# Patient Record
Sex: FEMALE | Race: WHITE
[De-identification: names, ages, dates, MRNs, and addresses within clinical notes are randomized per-mention and may not be internally consistent; named-entity substitution may affect disease eponyms.]

---

## 2022-02-23 ENCOUNTER — HOSPITAL ENCOUNTER (OUTPATIENT)
Dept: HOSPITAL 100 - OLS.ACW300 | Age: 82
Discharge: HOME | End: 2022-02-23
Payer: SELF-PAY

## 2022-02-23 DIAGNOSIS — R27.9: ICD-10-CM
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
## 2022-02-23 DIAGNOSIS — M62.81: ICD-10-CM

## 2022-02-23 DIAGNOSIS — M79.7: ICD-10-CM

## 2022-02-23 DIAGNOSIS — G35: Primary | ICD-10-CM

## 2022-02-23 DIAGNOSIS — R41.841: ICD-10-CM

## 2022-02-23 LAB
ALANINE AMINOTRANSFER ALT/SGPT: 21 U/L (ref 13–56)
ALBUMIN SERPL-MCNC: 2.9 G/DL (ref 3.2–5)
ALKALINE PHOSPHATASE: 102 U/L (ref 45–117)
ANION GAP: 3 (ref 5–15)
AST(SGOT): 7 U/L (ref 15–37)
BUN SERPL-MCNC: 18 MG/DL (ref 7–18)
BUN/CREAT RATIO: 24.6 RATIO (ref 10–20)
CALCIUM SERPL-MCNC: 8.9 MG/DL (ref 8.5–10.1)
CARBON DIOXIDE: 33 MMOL/L (ref 21–32)
CHLORIDE: 104 MMOL/L (ref 98–107)
CHOLEST SERPL-MCNC: 118 MG/DL
DEPRECATED RDW RBC: 46.4 FL (ref 35.1–43.9)
ERYTHROCYTE [DISTWIDTH] IN BLOOD: 14.6 % (ref 11.6–14.6)
EST GLOM FILT RATE - AFR AMER: 98 ML/MIN (ref 60–?)
GLOBULIN: 3.2 G/DL (ref 2.2–4.2)
GLUCOSE: 82 MG/DL (ref 74–106)
HCT VFR BLD AUTO: 31.3 % (ref 37–47)
HEMOGLOBIN: 11.3 G/DL (ref 12–15)
HGB BLD-MCNC: 11.3 G/DL (ref 12–15)
MCV RBC: 91.8 FL (ref 81–99)
MEAN CORP HGB CONC: 36.1 G/DL (ref 32–36)
MEAN PLATELET VOL.: 10.3 FL (ref 6.2–12)
PLATELET # BLD: 228 K/MM3 (ref 150–450)
PLATELET COUNT: 228 K/MM3 (ref 150–450)
POTASSIUM: 3.4 MMOL/L (ref 3.5–5.1)
RBC # BLD AUTO: 3.41 M/MM3 (ref 4.2–5.4)
RBC DISTRIBUTION WIDTH CV: 14.6 % (ref 11.6–14.6)
RBC DISTRIBUTION WIDTH SD: 46.4 FL (ref 35.1–43.9)
TRIGLYCERIDES: 54 MG/DL
VITAMIN D,25 HYDROXY: 60.4 NG/ML
VLDLC SERPL-MCNC: 11 MG/DL (ref 5–40)
WBC # BLD AUTO: 4.4 K/MM3 (ref 4.4–11)
WHITE BLOOD COUNT: 4.4 K/MM3 (ref 4.4–11)

## 2022-02-23 PROCEDURE — P9604 ONE-WAY ALLOW PRORATED TRIP: HCPCS

## 2022-02-23 PROCEDURE — 84443 ASSAY THYROID STIM HORMONE: CPT

## 2022-02-23 PROCEDURE — 36415 COLL VENOUS BLD VENIPUNCTURE: CPT

## 2022-02-23 PROCEDURE — 80061 LIPID PANEL: CPT

## 2022-02-23 PROCEDURE — 80053 COMPREHEN METABOLIC PANEL: CPT

## 2022-02-23 PROCEDURE — 85027 COMPLETE CBC AUTOMATED: CPT

## 2022-02-23 PROCEDURE — 82306 VITAMIN D 25 HYDROXY: CPT

## 2022-07-11 ENCOUNTER — HOSPITAL ENCOUNTER (OUTPATIENT)
Dept: HOSPITAL 100 - OLS.ACW100 | Age: 82
Discharge: HOME | End: 2022-07-11
Payer: SELF-PAY

## 2022-07-11 DIAGNOSIS — K80.20: ICD-10-CM

## 2022-07-11 DIAGNOSIS — G58.9: ICD-10-CM

## 2022-07-11 DIAGNOSIS — M41.9: ICD-10-CM

## 2022-07-11 DIAGNOSIS — M19.90: ICD-10-CM

## 2022-07-11 DIAGNOSIS — E55.9: ICD-10-CM

## 2022-07-11 DIAGNOSIS — M54.50: Primary | ICD-10-CM

## 2022-07-11 LAB
ALANINE AMINOTRANSFER ALT/SGPT: 14 U/L (ref 13–56)
ALBUMIN SERPL-MCNC: 3 G/DL (ref 3.2–5)
ALKALINE PHOSPHATASE: 93 U/L (ref 45–117)
ANION GAP: 8 (ref 5–15)
AST(SGOT): 14 U/L (ref 15–37)
BUN SERPL-MCNC: 22 MG/DL (ref 7–18)
BUN/CREAT RATIO: 32.9 RATIO (ref 10–20)
CALCIUM SERPL-MCNC: 8.8 MG/DL (ref 8.5–10.1)
CARBON DIOXIDE: 29 MMOL/L (ref 21–32)
CHLORIDE: 105 MMOL/L (ref 98–107)
CHOLEST SERPL-MCNC: 126 MG/DL
DEPRECATED RDW RBC: 46.1 FL (ref 35.1–43.9)
ERYTHROCYTE [DISTWIDTH] IN BLOOD: 13.8 % (ref 11.6–14.6)
EST GLOM FILT RATE - AFR AMER: 109 ML/MIN (ref 60–?)
GLOBULIN: 3.1 G/DL (ref 2.2–4.2)
GLUCOSE: 67 MG/DL (ref 74–106)
HCT VFR BLD AUTO: 36.9 % (ref 37–47)
HEMOGLOBIN: 11.8 G/DL (ref 12–15)
HGB BLD-MCNC: 11.8 G/DL (ref 12–15)
MCV RBC: 91.8 FL (ref 81–99)
MEAN CORP HGB CONC: 32 G/DL (ref 32–36)
MEAN PLATELET VOL.: 10.1 FL (ref 6.2–12)
PLATELET # BLD: 269 K/MM3 (ref 150–450)
PLATELET COUNT: 269 K/MM3 (ref 150–450)
POTASSIUM: 4.2 MMOL/L (ref 3.5–5.1)
RBC # BLD AUTO: 4.02 M/MM3 (ref 4.2–5.4)
RBC DISTRIBUTION WIDTH CV: 13.8 % (ref 11.6–14.6)
RBC DISTRIBUTION WIDTH SD: 46.1 FL (ref 35.1–43.9)
TRIGLYCERIDES: 31 MG/DL
VITAMIN D,25 HYDROXY: 74.8 NG/ML
VLDLC SERPL-MCNC: 6 MG/DL (ref 5–40)
WBC # BLD AUTO: 4.2 K/MM3 (ref 4.4–11)
WHITE BLOOD COUNT: 4.2 K/MM3 (ref 4.4–11)

## 2022-07-11 PROCEDURE — 80061 LIPID PANEL: CPT

## 2022-07-11 PROCEDURE — 80053 COMPREHEN METABOLIC PANEL: CPT

## 2022-07-11 PROCEDURE — 82306 VITAMIN D 25 HYDROXY: CPT

## 2022-07-11 PROCEDURE — P9604 ONE-WAY ALLOW PRORATED TRIP: HCPCS

## 2022-07-11 PROCEDURE — 36415 COLL VENOUS BLD VENIPUNCTURE: CPT

## 2022-07-11 PROCEDURE — 84443 ASSAY THYROID STIM HORMONE: CPT

## 2022-07-11 PROCEDURE — 85027 COMPLETE CBC AUTOMATED: CPT

## 2022-09-12 ENCOUNTER — HOSPITAL ENCOUNTER (OUTPATIENT)
Dept: HOSPITAL 100 - OLS.ACW100 | Age: 82
End: 2022-09-12
Payer: MEDICARE

## 2022-09-12 DIAGNOSIS — R73.09: ICD-10-CM

## 2022-09-12 DIAGNOSIS — R94.31: ICD-10-CM

## 2022-09-12 DIAGNOSIS — I87.2: ICD-10-CM

## 2022-09-12 DIAGNOSIS — R30.0: Primary | ICD-10-CM

## 2022-09-12 DIAGNOSIS — S06.0X0D: ICD-10-CM

## 2022-09-12 LAB
CALCIUM OXALATE CRYSTALS UR: (no result) /HPF
CAOX CRY URNS QL MICRO: (no result) /HPF
LEUKOCYTE ESTERASE UR QL STRIP: 25 /UL
MUCOUS THREADS URNS QL MICRO: (no result) /HPF
PROT UR QL STRIP.AUTO: 15 MG/DL
RBC UR QL: (no result) /HPF (ref 0–5)
RBC UR QL: 50 /UL
SP GR UR: 1.01 (ref 1–1.03)
SQUAMOUS URNS QL MICRO: (no result) /HPF (ref 5–10)
URINE PRESERVATIVE: (no result)

## 2022-09-12 PROCEDURE — 81001 URINALYSIS AUTO W/SCOPE: CPT

## 2022-09-12 PROCEDURE — 87186 SC STD MICRODIL/AGAR DIL: CPT

## 2022-09-12 PROCEDURE — 87088 URINE BACTERIA CULTURE: CPT

## 2022-09-12 PROCEDURE — 87086 URINE CULTURE/COLONY COUNT: CPT

## 2022-09-12 PROCEDURE — 87077 CULTURE AEROBIC IDENTIFY: CPT

## 2022-10-21 ENCOUNTER — HOSPITAL ENCOUNTER (OUTPATIENT)
Dept: HOSPITAL 100 - OLS.ACW300 | Age: 82
End: 2022-10-21
Payer: MEDICARE

## 2022-10-21 DIAGNOSIS — I87.2: Primary | ICD-10-CM

## 2022-10-21 DIAGNOSIS — Z79.899: ICD-10-CM

## 2022-10-21 DIAGNOSIS — R73.09: ICD-10-CM

## 2022-10-21 DIAGNOSIS — R94.31: ICD-10-CM

## 2022-10-21 DIAGNOSIS — S06.0X0D: ICD-10-CM

## 2022-10-21 LAB
LEUKOCYTE ESTERASE UR QL STRIP: 25 /UL
MUCOUS THREADS URNS QL MICRO: (no result) /HPF
RBC UR QL: (no result) /HPF (ref 0–5)
RBC UR QL: 25 /UL
SP GR UR: 1.02 (ref 1–1.03)
SQUAMOUS URNS QL MICRO: (no result) /HPF (ref 5–10)
URINE PRESERVATIVE: (no result)

## 2022-10-21 PROCEDURE — 81001 URINALYSIS AUTO W/SCOPE: CPT

## 2022-10-21 PROCEDURE — 87088 URINE BACTERIA CULTURE: CPT

## 2022-10-21 PROCEDURE — 87086 URINE CULTURE/COLONY COUNT: CPT

## 2022-10-21 PROCEDURE — 87186 SC STD MICRODIL/AGAR DIL: CPT

## 2022-10-21 PROCEDURE — 87077 CULTURE AEROBIC IDENTIFY: CPT

## 2023-05-17 ENCOUNTER — HOSPITAL ENCOUNTER (OUTPATIENT)
Dept: HOSPITAL 100 - OLS.ACW300 | Age: 83
End: 2023-05-17
Payer: MEDICARE

## 2023-05-17 DIAGNOSIS — N39.0: Primary | ICD-10-CM

## 2023-05-17 LAB — EST GLOM FILT RATE - AFR AMER: 91 ML/MIN (ref 60–?)

## 2023-05-17 PROCEDURE — 87077 CULTURE AEROBIC IDENTIFY: CPT

## 2023-05-17 PROCEDURE — 87088 URINE BACTERIA CULTURE: CPT

## 2023-05-17 PROCEDURE — 36415 COLL VENOUS BLD VENIPUNCTURE: CPT

## 2023-05-17 PROCEDURE — 87186 SC STD MICRODIL/AGAR DIL: CPT

## 2023-05-17 PROCEDURE — 87086 URINE CULTURE/COLONY COUNT: CPT

## 2023-05-17 PROCEDURE — P9604 ONE-WAY ALLOW PRORATED TRIP: HCPCS

## 2023-05-17 PROCEDURE — 82565 ASSAY OF CREATININE: CPT

## 2023-06-03 ENCOUNTER — HOSPITAL ENCOUNTER (OUTPATIENT)
Dept: HOSPITAL 100 - OLS.ACW300 | Age: 83
End: 2023-06-03
Payer: MEDICARE

## 2023-06-03 DIAGNOSIS — T76.11XD: Primary | ICD-10-CM

## 2023-06-03 DIAGNOSIS — I87.2: ICD-10-CM

## 2023-06-03 DIAGNOSIS — S06.0X0D: ICD-10-CM

## 2023-06-03 DIAGNOSIS — R94.31: ICD-10-CM

## 2023-06-03 LAB — EST GLOM FILT RATE - AFR AMER: 87 ML/MIN (ref 60–?)

## 2023-06-03 PROCEDURE — 36415 COLL VENOUS BLD VENIPUNCTURE: CPT

## 2023-06-03 PROCEDURE — 82565 ASSAY OF CREATININE: CPT

## 2023-06-03 PROCEDURE — P9604 ONE-WAY ALLOW PRORATED TRIP: HCPCS

## 2023-06-19 ENCOUNTER — HOSPITAL ENCOUNTER (OUTPATIENT)
Dept: HOSPITAL 100 - OLS.ACW300 | Age: 83
End: 2023-06-19
Payer: MEDICARE

## 2023-06-19 DIAGNOSIS — R39.9: Primary | ICD-10-CM

## 2023-06-19 LAB
LEUKOCYTE ESTERASE UR QL STRIP: 500 /UL
PROT UR QL STRIP.AUTO: 30 MG/DL
RBC UR QL: 150 /UL
SP GR UR: 1.01 (ref 1–1.03)
URINE PRESERVATIVE: (no result)

## 2023-06-19 PROCEDURE — 81002 URINALYSIS NONAUTO W/O SCOPE: CPT

## 2023-06-19 PROCEDURE — 87077 CULTURE AEROBIC IDENTIFY: CPT

## 2023-06-19 PROCEDURE — 87086 URINE CULTURE/COLONY COUNT: CPT

## 2023-06-19 PROCEDURE — 87186 SC STD MICRODIL/AGAR DIL: CPT

## 2023-06-19 PROCEDURE — 87088 URINE BACTERIA CULTURE: CPT

## 2023-07-07 ENCOUNTER — HOSPITAL ENCOUNTER (OUTPATIENT)
Dept: HOSPITAL 100 - OLS.ACW300 | Age: 83
End: 2023-07-07
Payer: MEDICARE

## 2023-07-07 DIAGNOSIS — Z79.899: Primary | ICD-10-CM

## 2023-07-07 LAB
LEUKOCYTE ESTERASE UR QL STRIP: 500 /UL
MUCOUS THREADS URNS QL MICRO: (no result) /HPF
PROT UR QL STRIP.AUTO: 15 MG/DL
RBC UR QL: (no result) /HPF (ref 0–5)
RBC UR QL: 150 /UL
SP GR UR: 1.01 (ref 1–1.03)
SQUAMOUS URNS QL MICRO: (no result) /HPF (ref 5–10)
TRI-PHOS CRY URNS QL MICRO: (no result) /HPF
TRIPLE PHOSPHATE CRYSTALS UR: (no result) /HPF
URINE PRESERVATIVE: (no result)

## 2023-07-07 PROCEDURE — 81001 URINALYSIS AUTO W/SCOPE: CPT

## 2023-07-07 PROCEDURE — 87088 URINE BACTERIA CULTURE: CPT

## 2023-07-07 PROCEDURE — 87186 SC STD MICRODIL/AGAR DIL: CPT

## 2023-07-07 PROCEDURE — 87077 CULTURE AEROBIC IDENTIFY: CPT

## 2023-07-07 PROCEDURE — 87086 URINE CULTURE/COLONY COUNT: CPT

## 2023-07-12 ENCOUNTER — HOSPITAL ENCOUNTER (OUTPATIENT)
Dept: HOSPITAL 100 - OLS.ACW300 | Age: 83
End: 2023-07-12
Payer: MEDICARE

## 2023-07-12 DIAGNOSIS — G35: Primary | ICD-10-CM

## 2023-07-12 LAB — ALBUMIN SERPL-MCNC: 2.9 G/DL (ref 3.2–5)

## 2023-07-12 PROCEDURE — P9604 ONE-WAY ALLOW PRORATED TRIP: HCPCS

## 2023-07-12 PROCEDURE — 82040 ASSAY OF SERUM ALBUMIN: CPT

## 2023-07-12 PROCEDURE — 36415 COLL VENOUS BLD VENIPUNCTURE: CPT

## 2023-07-21 ENCOUNTER — HOSPITAL ENCOUNTER (OUTPATIENT)
Dept: HOSPITAL 100 - OLS.ACW300 | Age: 83
End: 2023-07-21
Payer: MEDICARE

## 2023-07-21 DIAGNOSIS — R30.0: Primary | ICD-10-CM

## 2023-07-21 LAB
LEUKOCYTE ESTERASE UR QL STRIP: 500 /UL
MUCOUS THREADS URNS QL MICRO: (no result) /HPF
RBC UR QL: (no result) /HPF (ref 0–5)
RBC UR QL: 150 /UL
SP GR UR: 1.01 (ref 1–1.03)
SQUAMOUS URNS QL MICRO: (no result) /HPF (ref 5–10)
URINE PRESERVATIVE: (no result)

## 2023-07-21 PROCEDURE — 87077 CULTURE AEROBIC IDENTIFY: CPT

## 2023-07-21 PROCEDURE — 87186 SC STD MICRODIL/AGAR DIL: CPT

## 2023-07-21 PROCEDURE — 87086 URINE CULTURE/COLONY COUNT: CPT

## 2023-07-21 PROCEDURE — 87088 URINE BACTERIA CULTURE: CPT

## 2023-07-21 PROCEDURE — 81001 URINALYSIS AUTO W/SCOPE: CPT

## 2023-08-23 ENCOUNTER — HOSPITAL ENCOUNTER (OUTPATIENT)
Dept: HOSPITAL 100 - OLS.ACW300 | Age: 83
End: 2023-08-23
Payer: MEDICARE

## 2023-08-23 DIAGNOSIS — Z79.899: Primary | ICD-10-CM

## 2023-08-23 PROCEDURE — 87086 URINE CULTURE/COLONY COUNT: CPT

## 2023-08-23 PROCEDURE — 87088 URINE BACTERIA CULTURE: CPT

## 2023-08-23 PROCEDURE — 81002 URINALYSIS NONAUTO W/O SCOPE: CPT

## 2023-08-23 PROCEDURE — 87186 SC STD MICRODIL/AGAR DIL: CPT

## 2023-08-23 PROCEDURE — 87077 CULTURE AEROBIC IDENTIFY: CPT

## 2023-08-24 LAB
LEUKOCYTE ESTERASE UR QL STRIP: 500 /UL
PROT UR QL STRIP.AUTO: 15 MG/DL
RBC UR QL: 150 /UL
SP GR UR: 1.01 (ref 1–1.03)
URINE PRESERVATIVE: (no result)

## 2023-09-12 ENCOUNTER — HOSPITAL ENCOUNTER (OUTPATIENT)
Dept: HOSPITAL 100 - OLS.ACW300 | Age: 83
End: 2023-09-12
Payer: MEDICARE

## 2023-09-12 DIAGNOSIS — Z79.899: ICD-10-CM

## 2023-09-12 DIAGNOSIS — I87.2: Primary | ICD-10-CM

## 2023-09-12 DIAGNOSIS — R94.31: ICD-10-CM

## 2023-09-12 LAB
ANION GAP: 1 (ref 5–15)
BUN SERPL-MCNC: 22 MG/DL (ref 7–18)
BUN/CREAT RATIO: 29.5 RATIO (ref 10–20)
CALCIUM SERPL-MCNC: 8.7 MG/DL (ref 8.5–10.1)
CARBON DIOXIDE: 31 MMOL/L (ref 21–32)
CHLORIDE: 110 MMOL/L (ref 98–107)
DEPRECATED RDW RBC: 49.5 FL (ref 35.1–43.9)
ERYTHROCYTE [DISTWIDTH] IN BLOOD: 14.5 % (ref 11.6–14.6)
EST GLOM FILT RATE - AFR AMER: 96 ML/MIN (ref 60–?)
GLUCOSE: 87 MG/DL (ref 74–106)
HCT VFR BLD AUTO: 39.1 % (ref 37–47)
HEMOGLOBIN: 12 G/DL (ref 12–15)
HGB BLD-MCNC: 12 G/DL (ref 12–15)
MCV RBC: 94 FL (ref 81–99)
MEAN CORP HGB CONC: 30.7 G/DL (ref 32–36)
MEAN PLATELET VOL.: 11 FL (ref 6.2–12)
PLATELET # BLD: 180 K/MM3 (ref 150–450)
PLATELET COUNT: 180 K/MM3 (ref 150–450)
POTASSIUM: 4.1 MMOL/L (ref 3.5–5.1)
RBC # BLD AUTO: 4.16 M/MM3 (ref 4.2–5.4)
RBC DISTRIBUTION WIDTH CV: 14.5 % (ref 11.6–14.6)
RBC DISTRIBUTION WIDTH SD: 49.5 FL (ref 35.1–43.9)
WBC # BLD AUTO: 4.7 K/MM3 (ref 4.4–11)
WHITE BLOOD COUNT: 4.7 K/MM3 (ref 4.4–11)

## 2023-09-12 PROCEDURE — P9604 ONE-WAY ALLOW PRORATED TRIP: HCPCS

## 2023-09-12 PROCEDURE — 36415 COLL VENOUS BLD VENIPUNCTURE: CPT

## 2023-09-12 PROCEDURE — 80048 BASIC METABOLIC PNL TOTAL CA: CPT

## 2023-09-12 PROCEDURE — 85027 COMPLETE CBC AUTOMATED: CPT

## 2023-10-04 ENCOUNTER — HOSPITAL ENCOUNTER (OUTPATIENT)
Dept: HOSPITAL 100 - OLS.ACW300 | Age: 83
End: 2023-10-04
Payer: MEDICARE

## 2023-10-04 DIAGNOSIS — Z79.899: Primary | ICD-10-CM

## 2023-10-04 LAB
LEUKOCYTE ESTERASE UR QL STRIP: 500 /UL
MUCOUS THREADS URNS QL MICRO: (no result) /HPF
PROT UR QL STRIP.AUTO: 30 MG/DL
RBC UR QL: (no result) /HPF (ref 0–5)
RBC UR QL: 50 /UL
SP GR UR: 1.01 (ref 1–1.03)
SQUAMOUS URNS QL MICRO: (no result) /HPF (ref 5–10)
URINE PRESERVATIVE: (no result)

## 2023-10-04 PROCEDURE — 87088 URINE BACTERIA CULTURE: CPT

## 2023-10-04 PROCEDURE — 87186 SC STD MICRODIL/AGAR DIL: CPT

## 2023-10-04 PROCEDURE — 87077 CULTURE AEROBIC IDENTIFY: CPT

## 2023-10-04 PROCEDURE — 81001 URINALYSIS AUTO W/SCOPE: CPT

## 2023-10-04 PROCEDURE — 87086 URINE CULTURE/COLONY COUNT: CPT

## 2023-10-11 ENCOUNTER — HOSPITAL ENCOUNTER (OUTPATIENT)
Dept: HOSPITAL 100 - OLS.ACW300 | Age: 83
End: 2023-10-11
Payer: MEDICARE

## 2023-10-11 DIAGNOSIS — S06.0X0D: ICD-10-CM

## 2023-10-11 DIAGNOSIS — R94.31: ICD-10-CM

## 2023-10-11 DIAGNOSIS — T76.11XD: Primary | ICD-10-CM

## 2023-10-11 DIAGNOSIS — I87.2: ICD-10-CM

## 2023-10-11 LAB
ANION GAP: 4 (ref 5–15)
BUN SERPL-MCNC: 24 MG/DL (ref 7–18)
BUN/CREAT RATIO: 31.7 RATIO (ref 10–20)
CALCIUM SERPL-MCNC: 9 MG/DL (ref 8.5–10.1)
CARBON DIOXIDE: 30 MMOL/L (ref 21–32)
CHLORIDE: 111 MMOL/L (ref 98–107)
DEPRECATED RDW RBC: 49.4 FL (ref 35.1–43.9)
ERYTHROCYTE [DISTWIDTH] IN BLOOD: 14.6 % (ref 11.6–14.6)
EST GLOM FILT RATE - AFR AMER: 94 ML/MIN (ref 60–?)
GLUCOSE: 81 MG/DL (ref 74–106)
HCT VFR BLD AUTO: 32.7 % (ref 37–47)
HEMOGLOBIN: 11.6 G/DL (ref 12–15)
HGB BLD-MCNC: 11.6 G/DL (ref 12–15)
MCV RBC: 95.9 FL (ref 81–99)
MEAN CORP HGB CONC: 35.5 G/DL (ref 32–36)
MEAN PLATELET VOL.: 10.8 FL (ref 6.2–12)
PLATELET # BLD: 175 K/MM3 (ref 150–450)
PLATELET COUNT: 175 K/MM3 (ref 150–450)
POTASSIUM: 3.9 MMOL/L (ref 3.5–5.1)
RBC # BLD AUTO: 3.41 M/MM3 (ref 4.2–5.4)
RBC DISTRIBUTION WIDTH CV: 14.6 % (ref 11.6–14.6)
RBC DISTRIBUTION WIDTH SD: 49.4 FL (ref 35.1–43.9)
WBC # BLD AUTO: 3.8 K/MM3 (ref 4.4–11)
WHITE BLOOD COUNT: 3.8 K/MM3 (ref 4.4–11)

## 2023-10-11 PROCEDURE — 36415 COLL VENOUS BLD VENIPUNCTURE: CPT

## 2023-10-11 PROCEDURE — P9604 ONE-WAY ALLOW PRORATED TRIP: HCPCS

## 2023-10-11 PROCEDURE — 85027 COMPLETE CBC AUTOMATED: CPT

## 2023-10-11 PROCEDURE — 80048 BASIC METABOLIC PNL TOTAL CA: CPT

## 2023-10-25 ENCOUNTER — HOSPITAL ENCOUNTER (OUTPATIENT)
Dept: HOSPITAL 100 - OLS.ACW300 | Age: 83
End: 2023-10-25
Payer: MEDICARE

## 2023-10-25 DIAGNOSIS — I87.2: ICD-10-CM

## 2023-10-25 DIAGNOSIS — S06.0X0D: ICD-10-CM

## 2023-10-25 DIAGNOSIS — R94.31: ICD-10-CM

## 2023-10-25 DIAGNOSIS — T76.11XD: ICD-10-CM

## 2023-10-25 DIAGNOSIS — M79.89: Primary | ICD-10-CM

## 2023-10-25 LAB
ALANINE AMINOTRANSFER ALT/SGPT: 19 U/L (ref 13–56)
ALBUMIN SERPL-MCNC: 2.8 G/DL (ref 3.2–5)
ALKALINE PHOSPHATASE: 150 U/L (ref 45–117)
ANION GAP: 3 (ref 5–15)
AST(SGOT): 12 U/L (ref 15–37)
BNP,B-TYPE NATRIURETIC PEPTIDE: 76.9 PG/ML (ref 0–100)
BUN SERPL-MCNC: 28 MG/DL (ref 7–18)
BUN/CREAT RATIO: 32.9 RATIO (ref 10–20)
CALCIUM SERPL-MCNC: 9.1 MG/DL (ref 8.5–10.1)
CARBON DIOXIDE: 29 MMOL/L (ref 21–32)
CHLORIDE: 109 MMOL/L (ref 98–107)
CRP SERPL-MCNC: < 2.9 MG/L (ref 0–3)
DEPRECATED RDW RBC: 47.4 FL (ref 35.1–43.9)
ERYTHROCYTE [DISTWIDTH] IN BLOOD: 14 % (ref 11.6–14.6)
EST GLOM FILT RATE - AFR AMER: 82 ML/MIN (ref 60–?)
GLOBULIN: 3 G/DL (ref 2.2–4.2)
GLUCOSE: 75 MG/DL (ref 74–106)
HCT VFR BLD AUTO: 36.9 % (ref 37–47)
HEMOGLOBIN: 12.4 G/DL (ref 12–15)
HGB BLD-MCNC: 12.4 G/DL (ref 12–15)
MAGNESIUM: 2.3 MG/DL (ref 1.6–2.6)
MCV RBC: 93.9 FL (ref 81–99)
MEAN CORP HGB CONC: 33.6 G/DL (ref 32–36)
MEAN PLATELET VOL.: 10.5 FL (ref 6.2–12)
PLATELET # BLD: 179 K/MM3 (ref 150–450)
PLATELET COUNT: 179 K/MM3 (ref 150–450)
POTASSIUM: 3.7 MMOL/L (ref 3.5–5.1)
RBC # BLD AUTO: 3.93 M/MM3 (ref 4.2–5.4)
RBC DISTRIBUTION WIDTH CV: 14 % (ref 11.6–14.6)
RBC DISTRIBUTION WIDTH SD: 47.4 FL (ref 35.1–43.9)
WBC # BLD AUTO: 3.8 K/MM3 (ref 4.4–11)
WHITE BLOOD COUNT: 3.8 K/MM3 (ref 4.4–11)

## 2023-10-25 PROCEDURE — 86140 C-REACTIVE PROTEIN: CPT

## 2023-10-25 PROCEDURE — 83735 ASSAY OF MAGNESIUM: CPT

## 2023-10-25 PROCEDURE — 80053 COMPREHEN METABOLIC PANEL: CPT

## 2023-10-25 PROCEDURE — 85027 COMPLETE CBC AUTOMATED: CPT

## 2023-10-25 PROCEDURE — P9604 ONE-WAY ALLOW PRORATED TRIP: HCPCS

## 2023-10-25 PROCEDURE — 36415 COLL VENOUS BLD VENIPUNCTURE: CPT

## 2023-10-25 PROCEDURE — 83880 ASSAY OF NATRIURETIC PEPTIDE: CPT

## 2023-11-03 ENCOUNTER — HOSPITAL ENCOUNTER (OUTPATIENT)
Dept: HOSPITAL 100 - OLS.ACW300 | Age: 83
End: 2023-11-03
Payer: MEDICARE

## 2023-11-03 DIAGNOSIS — E03.9: Primary | ICD-10-CM

## 2023-11-03 LAB
T3 SERPL IA-MCNC: 1 NG/ML (ref 0.6–1.81)
T4 SERPL-MCNC: 9.5 UG/DL (ref 4.8–13.9)

## 2023-11-03 PROCEDURE — P9604 ONE-WAY ALLOW PRORATED TRIP: HCPCS

## 2023-11-03 PROCEDURE — 36415 COLL VENOUS BLD VENIPUNCTURE: CPT

## 2023-11-03 PROCEDURE — 84480 ASSAY TRIIODOTHYRONINE (T3): CPT

## 2023-11-03 PROCEDURE — 84436 ASSAY OF TOTAL THYROXINE: CPT

## 2023-11-03 PROCEDURE — 84443 ASSAY THYROID STIM HORMONE: CPT

## 2024-01-18 ENCOUNTER — HOSPITAL ENCOUNTER (OUTPATIENT)
Dept: HOSPITAL 100 - OLS.ACW300 | Age: 84
End: 2024-01-18
Payer: MEDICARE

## 2024-01-18 DIAGNOSIS — I87.2: ICD-10-CM

## 2024-01-18 DIAGNOSIS — R94.31: ICD-10-CM

## 2024-01-18 DIAGNOSIS — Z79.899: ICD-10-CM

## 2024-01-18 DIAGNOSIS — T76.11XD: Primary | ICD-10-CM

## 2024-01-18 LAB
ANION GAP: 2 (ref 5–15)
BUN SERPL-MCNC: 27 MG/DL (ref 7–18)
BUN/CREAT RATIO: 30.1 RATIO (ref 10–20)
CALCIUM SERPL-MCNC: 9.4 MG/DL (ref 8.5–10.1)
CARBON DIOXIDE: 31 MMOL/L (ref 21–32)
CHLORIDE: 108 MMOL/L (ref 98–107)
DEPRECATED RDW RBC: 47.6 FL (ref 35.1–43.9)
ERYTHROCYTE [DISTWIDTH] IN BLOOD: 14 % (ref 11.6–14.6)
EST GLOM FILT RATE - AFR AMER: 77 ML/MIN (ref 60–?)
GLUCOSE: 79 MG/DL (ref 74–106)
HCT VFR BLD AUTO: 39.8 % (ref 37–47)
HGB BLD-MCNC: 12.9 G/DL (ref 12–15)
MCV RBC: 94.8 FL (ref 81–99)
MEAN CORP HGB CONC: 32.4 G/DL (ref 32–36)
MEAN PLATELET VOL.: 10.6 FL (ref 6.2–12)
PLATELET # BLD: 180 K/MM3 (ref 150–450)
POTASSIUM: 3.6 MMOL/L (ref 3.5–5.1)
RBC # BLD AUTO: 4.2 M/MM3 (ref 4.2–5.4)
T3 SERPL IA-MCNC: 0.92 NG/ML (ref 0.6–1.81)
T4 SERPL-MCNC: 9.7 UG/DL (ref 4.8–13.9)
WBC # BLD AUTO: 4.3 K/MM3 (ref 4.4–11)

## 2024-01-18 PROCEDURE — 83036 HEMOGLOBIN GLYCOSYLATED A1C: CPT

## 2024-01-18 PROCEDURE — 84480 ASSAY TRIIODOTHYRONINE (T3): CPT

## 2024-01-18 PROCEDURE — 84436 ASSAY OF TOTAL THYROXINE: CPT

## 2024-01-18 PROCEDURE — 36415 COLL VENOUS BLD VENIPUNCTURE: CPT

## 2024-01-18 PROCEDURE — 84443 ASSAY THYROID STIM HORMONE: CPT

## 2024-01-18 PROCEDURE — 80048 BASIC METABOLIC PNL TOTAL CA: CPT

## 2024-01-18 PROCEDURE — 85027 COMPLETE CBC AUTOMATED: CPT

## 2024-01-18 PROCEDURE — P9604 ONE-WAY ALLOW PRORATED TRIP: HCPCS

## 2024-01-31 ENCOUNTER — HOSPITAL ENCOUNTER (OUTPATIENT)
Dept: HOSPITAL 100 - OLS.ACW300 | Age: 84
End: 2024-01-31
Payer: MEDICARE

## 2024-01-31 DIAGNOSIS — T76.11XD: ICD-10-CM

## 2024-01-31 DIAGNOSIS — S06.0X0D: ICD-10-CM

## 2024-01-31 DIAGNOSIS — I87.2: Primary | ICD-10-CM

## 2024-01-31 LAB — EST GLOM FILT RATE - AFR AMER: 77 ML/MIN (ref 60–?)

## 2024-01-31 PROCEDURE — 36415 COLL VENOUS BLD VENIPUNCTURE: CPT

## 2024-01-31 PROCEDURE — P9604 ONE-WAY ALLOW PRORATED TRIP: HCPCS

## 2024-01-31 PROCEDURE — 82565 ASSAY OF CREATININE: CPT

## 2024-02-06 ENCOUNTER — HOSPITAL ENCOUNTER (OUTPATIENT)
Dept: HOSPITAL 100 - OLS.ACW300 | Age: 84
End: 2024-02-06
Payer: MEDICARE

## 2024-02-06 DIAGNOSIS — R94.31: ICD-10-CM

## 2024-02-06 DIAGNOSIS — T76.11XD: Primary | ICD-10-CM

## 2024-02-06 DIAGNOSIS — I87.2: ICD-10-CM

## 2024-02-06 DIAGNOSIS — Z79.899: ICD-10-CM

## 2024-02-06 PROCEDURE — 36415 COLL VENOUS BLD VENIPUNCTURE: CPT

## 2024-02-06 PROCEDURE — 84443 ASSAY THYROID STIM HORMONE: CPT

## 2024-02-06 PROCEDURE — P9604 ONE-WAY ALLOW PRORATED TRIP: HCPCS

## 2024-05-01 ENCOUNTER — HOSPITAL ENCOUNTER (OUTPATIENT)
Dept: HOSPITAL 100 - OLS.ACW300 | Age: 84
End: 2024-05-01
Payer: MEDICARE

## 2024-05-01 DIAGNOSIS — S06.0X0D: ICD-10-CM

## 2024-05-01 DIAGNOSIS — T76.11XD: Primary | ICD-10-CM

## 2024-05-01 DIAGNOSIS — R94.31: ICD-10-CM

## 2024-05-01 DIAGNOSIS — I87.2: ICD-10-CM

## 2024-05-01 LAB
T3 SERPL IA-MCNC: 1.06 NG/ML (ref 0.6–1.81)
T4 SERPL-MCNC: 8.8 UG/DL (ref 4.8–13.9)

## 2024-05-01 PROCEDURE — 84436 ASSAY OF TOTAL THYROXINE: CPT

## 2024-05-01 PROCEDURE — 84480 ASSAY TRIIODOTHYRONINE (T3): CPT

## 2024-05-01 PROCEDURE — 84443 ASSAY THYROID STIM HORMONE: CPT

## 2024-05-01 PROCEDURE — 36415 COLL VENOUS BLD VENIPUNCTURE: CPT

## 2024-05-01 PROCEDURE — P9604 ONE-WAY ALLOW PRORATED TRIP: HCPCS

## 2024-07-01 ENCOUNTER — HOSPITAL ENCOUNTER (OUTPATIENT)
Dept: HOSPITAL 100 - OLS.ACW300 | Age: 84
End: 2024-07-01
Payer: MEDICARE

## 2024-07-01 DIAGNOSIS — S06.0X0D: ICD-10-CM

## 2024-07-01 DIAGNOSIS — T76.11XD: Primary | ICD-10-CM

## 2024-07-01 DIAGNOSIS — R94.31: ICD-10-CM

## 2024-07-01 DIAGNOSIS — I87.2: ICD-10-CM

## 2024-07-01 LAB
CHOLEST SERPL-MCNC: 133 MG/DL
TRIGLYCERIDES: 36 MG/DL
VITAMIN D,25 HYDROXY: 89.8 NG/ML
VLDLC SERPL-MCNC: 7 MG/DL (ref 5–40)

## 2024-07-01 PROCEDURE — 36415 COLL VENOUS BLD VENIPUNCTURE: CPT

## 2024-07-01 PROCEDURE — 82306 VITAMIN D 25 HYDROXY: CPT

## 2024-07-01 PROCEDURE — P9604 ONE-WAY ALLOW PRORATED TRIP: HCPCS

## 2024-07-01 PROCEDURE — 80061 LIPID PANEL: CPT

## 2024-07-16 ENCOUNTER — HOSPITAL ENCOUNTER (OUTPATIENT)
Dept: HOSPITAL 100 - OLS.ACW300 | Age: 84
End: 2024-07-16
Payer: MEDICARE

## 2024-07-16 DIAGNOSIS — S91.002A: Primary | ICD-10-CM

## 2024-07-16 PROCEDURE — 87186 SC STD MICRODIL/AGAR DIL: CPT

## 2024-07-16 PROCEDURE — 87205 SMEAR GRAM STAIN: CPT

## 2024-07-16 PROCEDURE — 87070 CULTURE OTHR SPECIMN AEROBIC: CPT

## 2024-07-16 PROCEDURE — 87077 CULTURE AEROBIC IDENTIFY: CPT

## 2024-07-24 ENCOUNTER — HOSPITAL ENCOUNTER (OUTPATIENT)
Dept: HOSPITAL 100 - OLS.ACW300 | Age: 84
End: 2024-07-24
Payer: MEDICARE

## 2024-07-24 DIAGNOSIS — R94.31: ICD-10-CM

## 2024-07-24 DIAGNOSIS — I87.2: ICD-10-CM

## 2024-07-24 DIAGNOSIS — L03.90: Primary | ICD-10-CM

## 2024-07-24 LAB
ANION GAP: 2 (ref 5–15)
BUN SERPL-MCNC: 29 MG/DL (ref 7–18)
BUN/CREAT RATIO: 34.7 RATIO (ref 10–20)
CALCIUM SERPL-MCNC: 9 MG/DL (ref 8.5–10.1)
CARBON DIOXIDE: 33 MMOL/L (ref 21–32)
CHLORIDE: 106 MMOL/L (ref 98–107)
DEPRECATED RDW RBC: 45.9 FL (ref 35.1–43.9)
ERYTHROCYTE [DISTWIDTH] IN BLOOD: 13.7 % (ref 11.6–14.6)
EST GLOM FILT RATE - AFR AMER: 84 ML/MIN (ref 60–?)
GLUCOSE: 89 MG/DL (ref 74–106)
HCT VFR BLD AUTO: 39.9 % (ref 37–47)
HEMOGLOBIN: 12.7 G/DL (ref 12–15)
HGB BLD-MCNC: 12.7 G/DL (ref 12–15)
MCV RBC: 91.7 FL (ref 81–99)
MEAN CORP HGB CONC: 31.8 G/DL (ref 32–36)
MEAN PLATELET VOL.: 10.8 FL (ref 6.2–12)
PLATELET # BLD: 232 K/MM3 (ref 150–450)
PLATELET COUNT: 232 K/MM3 (ref 150–450)
POTASSIUM: 4 MMOL/L (ref 3.5–5.1)
RBC # BLD AUTO: 4.35 M/MM3 (ref 4.2–5.4)
RBC DISTRIBUTION WIDTH CV: 13.7 % (ref 11.6–14.6)
RBC DISTRIBUTION WIDTH SD: 45.9 FL (ref 35.1–43.9)
WBC # BLD AUTO: 5.1 K/MM3 (ref 4.4–11)
WHITE BLOOD COUNT: 5.1 K/MM3 (ref 4.4–11)

## 2024-07-24 PROCEDURE — P9604 ONE-WAY ALLOW PRORATED TRIP: HCPCS

## 2024-07-24 PROCEDURE — 80048 BASIC METABOLIC PNL TOTAL CA: CPT

## 2024-07-24 PROCEDURE — 85027 COMPLETE CBC AUTOMATED: CPT

## 2024-07-24 PROCEDURE — 36415 COLL VENOUS BLD VENIPUNCTURE: CPT

## 2024-08-16 ENCOUNTER — HOSPITAL ENCOUNTER (OUTPATIENT)
Dept: HOSPITAL 100 - OLS.ACW300 | Age: 84
End: 2024-08-16
Payer: MEDICARE

## 2024-08-16 DIAGNOSIS — R30.0: Primary | ICD-10-CM

## 2024-08-16 LAB
LEUKOCYTE ESTERASE UR QL STRIP: 500 /UL
PROT UR QL STRIP.AUTO: 30 MG/DL
RBC UR QL: 150 /UL
SP GR UR: 1.02 (ref 1–1.03)
URINE PRESERVATIVE: (no result)

## 2024-08-16 PROCEDURE — 87077 CULTURE AEROBIC IDENTIFY: CPT

## 2024-08-16 PROCEDURE — 87186 SC STD MICRODIL/AGAR DIL: CPT

## 2024-08-16 PROCEDURE — 87088 URINE BACTERIA CULTURE: CPT

## 2024-08-16 PROCEDURE — 87086 URINE CULTURE/COLONY COUNT: CPT

## 2024-08-16 PROCEDURE — 81002 URINALYSIS NONAUTO W/O SCOPE: CPT

## 2024-08-20 ENCOUNTER — HOSPITAL ENCOUNTER (OUTPATIENT)
Dept: HOSPITAL 100 - OLS.ACW300 | Age: 84
End: 2024-08-20
Payer: MEDICARE

## 2024-08-20 DIAGNOSIS — R94.31: ICD-10-CM

## 2024-08-20 DIAGNOSIS — I87.2: Primary | ICD-10-CM

## 2024-08-20 PROCEDURE — 84443 ASSAY THYROID STIM HORMONE: CPT

## 2024-08-20 PROCEDURE — P9604 ONE-WAY ALLOW PRORATED TRIP: HCPCS

## 2024-08-20 PROCEDURE — 36415 COLL VENOUS BLD VENIPUNCTURE: CPT

## 2024-09-12 ENCOUNTER — HOSPITAL ENCOUNTER (OUTPATIENT)
Dept: HOSPITAL 100 - OLS.ACW300 | Age: 84
End: 2024-09-12
Payer: MEDICARE

## 2024-09-12 DIAGNOSIS — R53.1: ICD-10-CM

## 2024-09-12 DIAGNOSIS — R41.841: ICD-10-CM

## 2024-09-12 DIAGNOSIS — I87.2: ICD-10-CM

## 2024-09-12 DIAGNOSIS — T76.11XD: Primary | ICD-10-CM

## 2024-09-12 DIAGNOSIS — R53.81: ICD-10-CM

## 2024-09-12 LAB
ANION GAP: 2 (ref 5–15)
BUN SERPL-MCNC: 18 MG/DL (ref 7–18)
BUN/CREAT RATIO: 23.8 RATIO (ref 10–20)
CALCIUM SERPL-MCNC: 9.4 MG/DL (ref 8.5–10.1)
CARBON DIOXIDE: 31 MMOL/L (ref 21–32)
CHLORIDE: 107 MMOL/L (ref 98–107)
EST GLOM FILT RATE - AFR AMER: 94 ML/MIN (ref 60–?)
GLUCOSE: 86 MG/DL (ref 74–106)
POTASSIUM: 4.1 MMOL/L (ref 3.5–5.1)

## 2024-09-12 PROCEDURE — 36415 COLL VENOUS BLD VENIPUNCTURE: CPT

## 2024-09-12 PROCEDURE — 80048 BASIC METABOLIC PNL TOTAL CA: CPT

## 2024-09-12 PROCEDURE — P9604 ONE-WAY ALLOW PRORATED TRIP: HCPCS

## 2024-09-13 ENCOUNTER — HOSPITAL ENCOUNTER (OUTPATIENT)
Dept: HOSPITAL 100 - OLS.ACW300 | Age: 84
End: 2024-09-13
Payer: MEDICARE

## 2024-09-13 DIAGNOSIS — T76.11XD: Primary | ICD-10-CM

## 2024-09-13 DIAGNOSIS — R53.1: ICD-10-CM

## 2024-09-13 DIAGNOSIS — R41.841: ICD-10-CM

## 2024-09-13 DIAGNOSIS — R53.81: ICD-10-CM

## 2024-09-13 DIAGNOSIS — I87.2: ICD-10-CM

## 2024-09-13 LAB
ALANINE AMINOTRANSFER ALT/SGPT: 18 U/L (ref 13–56)
ALBUMIN SERPL-MCNC: 2.7 G/DL (ref 3.2–5)
ALKALINE PHOSPHATASE: 93 U/L (ref 45–117)
ANION GAP: 4 (ref 5–15)
AST(SGOT): 18 U/L (ref 15–37)
BUN SERPL-MCNC: 20 MG/DL (ref 7–18)
BUN/CREAT RATIO: 26.8 RATIO (ref 10–20)
CALCIUM SERPL-MCNC: 9.3 MG/DL (ref 8.5–10.1)
CARBON DIOXIDE: 31 MMOL/L (ref 21–32)
CHLORIDE: 105 MMOL/L (ref 98–107)
DEPRECATED RDW RBC: 51.4 FL (ref 35.1–43.9)
ERYTHROCYTE [DISTWIDTH] IN BLOOD: 15.5 % (ref 11.6–14.6)
EST GLOM FILT RATE - AFR AMER: 95 ML/MIN (ref 60–?)
GLOBULIN: 3.4 G/DL (ref 2.2–4.2)
GLUCOSE: 86 MG/DL (ref 74–106)
HCT VFR BLD AUTO: 33.8 % (ref 37–47)
HEMOGLOBIN: 11 G/DL (ref 12–15)
HGB BLD-MCNC: 11 G/DL (ref 12–15)
MCV RBC: 92.3 FL (ref 81–99)
MEAN CORP HGB CONC: 32.5 G/DL (ref 32–36)
MEAN PLATELET VOL.: 10.4 FL (ref 6.2–12)
PLATELET # BLD: 279 K/MM3 (ref 150–450)
PLATELET COUNT: 279 K/MM3 (ref 150–450)
POTASSIUM: 4.3 MMOL/L (ref 3.5–5.1)
RBC # BLD AUTO: 3.66 M/MM3 (ref 4.2–5.4)
RBC DISTRIBUTION WIDTH CV: 15.5 % (ref 11.6–14.6)
RBC DISTRIBUTION WIDTH SD: 51.4 FL (ref 35.1–43.9)
WBC # BLD AUTO: 4.4 K/MM3 (ref 4.4–11)
WHITE BLOOD COUNT: 4.4 K/MM3 (ref 4.4–11)

## 2024-09-13 PROCEDURE — 80053 COMPREHEN METABOLIC PANEL: CPT

## 2024-09-13 PROCEDURE — 36415 COLL VENOUS BLD VENIPUNCTURE: CPT

## 2024-09-13 PROCEDURE — P9604 ONE-WAY ALLOW PRORATED TRIP: HCPCS

## 2024-09-13 PROCEDURE — 85027 COMPLETE CBC AUTOMATED: CPT

## 2024-09-16 ENCOUNTER — HOSPITAL ENCOUNTER (OUTPATIENT)
Dept: HOSPITAL 100 - OLS.ACW300 | Age: 84
End: 2024-09-16
Payer: MEDICARE

## 2024-09-16 DIAGNOSIS — I87.2: ICD-10-CM

## 2024-09-16 DIAGNOSIS — R41.841: ICD-10-CM

## 2024-09-16 DIAGNOSIS — R26.81: ICD-10-CM

## 2024-09-16 DIAGNOSIS — T76.11XD: Primary | ICD-10-CM

## 2024-09-16 DIAGNOSIS — R53.81: ICD-10-CM

## 2024-09-16 DIAGNOSIS — R53.1: ICD-10-CM

## 2024-09-16 LAB
ANION GAP: 4 (ref 5–15)
BUN SERPL-MCNC: 28 MG/DL (ref 7–18)
BUN/CREAT RATIO: 28.1 RATIO (ref 10–20)
CALCIUM SERPL-MCNC: 9.6 MG/DL (ref 8.5–10.1)
CARBON DIOXIDE: 34 MMOL/L (ref 21–32)
CHLORIDE: 101 MMOL/L (ref 98–107)
DEPRECATED RDW RBC: 51.8 FL (ref 35.1–43.9)
ERYTHROCYTE [DISTWIDTH] IN BLOOD: 15.6 % (ref 11.6–14.6)
EST GLOM FILT RATE - AFR AMER: 68 ML/MIN (ref 60–?)
GLUCOSE: 105 MG/DL (ref 74–106)
HCT VFR BLD AUTO: 36.7 % (ref 37–47)
HEMOGLOBIN: 11.5 G/DL (ref 12–15)
HGB BLD-MCNC: 11.5 G/DL (ref 12–15)
IMMATURE GRANULOCYTES COUNT: 0.01 X10^3/UL (ref 0–0)
MCV RBC: 91.3 FL (ref 81–99)
MEAN CORP HGB CONC: 31.3 G/DL (ref 32–36)
MEAN PLATELET VOL.: 10.3 FL (ref 6.2–12)
NRBC FLAGGED BY ANALYZER: 0 % (ref 0–5)
PLATELET # BLD: 354 K/MM3 (ref 150–450)
PLATELET COUNT: 354 K/MM3 (ref 150–450)
POTASSIUM: 4.1 MMOL/L (ref 3.5–5.1)
RBC # BLD AUTO: 4.02 M/MM3 (ref 4.2–5.4)
RBC DISTRIBUTION WIDTH CV: 15.6 % (ref 11.6–14.6)
RBC DISTRIBUTION WIDTH SD: 51.8 FL (ref 35.1–43.9)
WBC # BLD AUTO: 4.7 K/MM3 (ref 4.4–11)
WHITE BLOOD COUNT: 4.7 K/MM3 (ref 4.4–11)

## 2024-09-16 PROCEDURE — P9604 ONE-WAY ALLOW PRORATED TRIP: HCPCS

## 2024-09-16 PROCEDURE — 85025 COMPLETE CBC W/AUTO DIFF WBC: CPT

## 2024-09-16 PROCEDURE — 36415 COLL VENOUS BLD VENIPUNCTURE: CPT

## 2024-09-16 PROCEDURE — 80048 BASIC METABOLIC PNL TOTAL CA: CPT

## 2024-11-04 ENCOUNTER — NURSING HOME VISIT (OUTPATIENT)
Dept: POST ACUTE CARE | Facility: EXTERNAL LOCATION | Age: 84
End: 2024-11-04
Payer: MEDICARE

## 2024-11-04 DIAGNOSIS — D68.62 LUPUS ANTICOAGULANT DISORDER (MULTI): Primary | ICD-10-CM

## 2024-11-04 DIAGNOSIS — E03.9 HYPOTHYROIDISM, UNSPECIFIED TYPE: ICD-10-CM

## 2024-11-04 DIAGNOSIS — F02.80 ALZHEIMER'S DISEASE: ICD-10-CM

## 2024-11-04 DIAGNOSIS — N31.8 HYPERACTIVITY OF BLADDER: ICD-10-CM

## 2024-11-04 DIAGNOSIS — G30.9 ALZHEIMER'S DISEASE: ICD-10-CM

## 2024-11-04 DIAGNOSIS — I10 HYPERTENSION, UNSPECIFIED TYPE: ICD-10-CM

## 2024-11-04 DIAGNOSIS — R53.1 WEAKNESS: ICD-10-CM

## 2024-11-04 DIAGNOSIS — M15.9 OSTEOARTHRITIS OF MULTIPLE JOINTS, UNSPECIFIED OSTEOARTHRITIS TYPE: ICD-10-CM

## 2024-11-04 DIAGNOSIS — F32.A DEPRESSION, UNSPECIFIED DEPRESSION TYPE: ICD-10-CM

## 2024-11-04 DIAGNOSIS — K21.9 GASTROESOPHAGEAL REFLUX DISEASE, UNSPECIFIED WHETHER ESOPHAGITIS PRESENT: ICD-10-CM

## 2024-11-04 DIAGNOSIS — E46 PROTEIN MALNUTRITION (MULTI): ICD-10-CM

## 2024-11-04 PROCEDURE — 99309 SBSQ NF CARE MODERATE MDM 30: CPT | Performed by: NURSE PRACTITIONER

## 2024-11-04 NOTE — PROGRESS NOTES
"PROGRESS NOTE    Subjective   Chief complaint: Josefa Richards is a 84 y.o. female who is a long term care patient being seen and evaluated for recent admission     HPI: Recently admitted to this facility for therapy. She is pleasant and talkative.   Requires cues to orient her to month and season. Is forgetful.   She reports that she is \"very particular about her routines and likes things done her way\".    Reports long history of osteoarthritis - especially in both knees.    Is motivated to regain strength and to return home. Is scheduled to be evaluated by therapy following this visit.    Verbalized concerns for her funds - advised her to discuss banking with .   She reports that she is picking at dry scabs in her nose- noted that she has kleenex with scattered tiny smears of dry blood. Will start saline nasal spray.   Currently denies any discomfort at rest. Reports that her knees hurt when she tries to stand. Denies any chest pain or tightness.    Reviewed current medications. Will add labs   She reports that she generally has a good appetite. Denies any gastric distress.   Recently assessed by mental health. BIMs score -13.   HPI      Objective   Vital signs: 127/56-83-19-98.1    Physical Exam  Vitals and nursing note reviewed.   Constitutional:       General: She is not in acute distress.     Appearance: She is underweight.      Comments: slim   Eyes:      Extraocular Movements: Extraocular movements intact.   Cardiovascular:      Rate and Rhythm: Normal rate and regular rhythm.   Pulmonary:      Effort: Pulmonary effort is normal.      Breath sounds: Normal breath sounds.      Comments: Lungs clear   Abdominal:      General: Bowel sounds are normal.      Palpations: Abdomen is soft.   Musculoskeletal:      Cervical back: Neck supple.      Right lower leg: No edema.      Left lower leg: No edema.   Neurological:      Mental Status: She is alert.   Psychiatric:         Attention and " Perception: Attention and perception normal.         Mood and Affect: Mood and affect normal.         Speech: Speech normal.         Behavior: Behavior is cooperative.         Assessment/Plan   Problem List Items Addressed This Visit       Hypothyroidism     Requires synthroid   Monitor TSH q 3 months   Monitor for symptoms of hypo/hyperthyroidism          Weakness     Remains in therapy   Monitor progress   Staff to assist with care only as required.            Osteoarthritis     Reports chronic HX OA predominately in her knees   No inflammation   She reports that she has bone on bone.   Reports that tramadol is helpful  Therapy   Voltaren gel to her knees topically            Alzheimer's disease     Has good social skills   Monitor judgement   BIMS score - 13 10/30/24   Is forgetful   Has been admitted into a secured unit for her safety.   Encourage all abilities   Monitor judgement and safety          Protein malnutrition (Multi)     HX adult failure to thrive   Frail underweight   Monitor intake   Monitor weight   Dietitian assessment          Hypertension     Monitor BP   Monitor labs    BP currently at goal    Lasix   Potassium supplement          Depression     HX depression   Paroxetine   Wellbutrin   Monitor mood, appetite and sleep habits   Mental health to follow   Encourage her to participate in unit activities          GERD (gastroesophageal reflux disease)     Monitor gastric distress   Monitor appetite   Currently on probiotic          Lupus anticoagulant disorder (Multi) - Primary     HX hypercoagulation   Remains on eliquis    Monitor for any evidence bleeding          Hyperactivity of bladder     Remains on oxybutynin and Myrbetriq   monitor effectiveness               Medications, treatments, and labs reviewed  Continue medications and treatments as listed in EMR    Mariam Moscoso, APRN-CNP

## 2024-11-04 NOTE — LETTER
"Patient: Josefa Richards  : 1940    Encounter Date: 2024    PROGRESS NOTE    Subjective  Chief complaint: Josefa Richards is a 84 y.o. female who is a long term care patient being seen and evaluated for recent admission     HPI: Recently admitted to this facility for therapy. She is pleasant and talkative.   Requires cues to orient her to month and season. Is forgetful.   She reports that she is \"very particular about her routines and likes things done her way\".    Reports long history of osteoarthritis - especially in both knees.    Is motivated to regain strength and to return home. Is scheduled to be evaluated by therapy following this visit.    Verbalized concerns for her funds - advised her to discuss banking with .   She reports that she is picking at dry scabs in her nose- noted that she has kleenex with scattered tiny smears of dry blood. Will start saline nasal spray.   Currently denies any discomfort at rest. Reports that her knees hurt when she tries to stand. Denies any chest pain or tightness.    Reviewed current medications. Will add labs   She reports that she generally has a good appetite. Denies any gastric distress.   Recently assessed by mental health. BIMs score -13.   HPI      Objective  Vital signs: 127/56-83-19-98.1    Physical Exam  Vitals and nursing note reviewed.   Constitutional:       General: She is not in acute distress.     Appearance: She is underweight.      Comments: slim   Eyes:      Extraocular Movements: Extraocular movements intact.   Cardiovascular:      Rate and Rhythm: Normal rate and regular rhythm.   Pulmonary:      Effort: Pulmonary effort is normal.      Breath sounds: Normal breath sounds.      Comments: Lungs clear   Abdominal:      General: Bowel sounds are normal.      Palpations: Abdomen is soft.   Musculoskeletal:      Cervical back: Neck supple.      Right lower leg: No edema.      Left lower leg: No edema.   Neurological:      " Mental Status: She is alert.   Psychiatric:         Attention and Perception: Attention and perception normal.         Mood and Affect: Mood and affect normal.         Speech: Speech normal.         Behavior: Behavior is cooperative.         Assessment/Plan  Problem List Items Addressed This Visit       Hypothyroidism     Requires synthroid   Monitor TSH q 3 months   Monitor for symptoms of hypo/hyperthyroidism          Weakness     Remains in therapy   Monitor progress   Staff to assist with care only as required.            Osteoarthritis     Reports chronic HX OA predominately in her knees   No inflammation   She reports that she has bone on bone.   Reports that tramadol is helpful  Therapy   Voltaren gel to her knees topically            Alzheimer's disease     Has good social skills   Monitor judgement   BIMS score - 13 10/30/24   Is forgetful   Has been admitted into a secured unit for her safety.   Encourage all abilities   Monitor judgement and safety          Protein malnutrition (Multi)     HX adult failure to thrive   Frail underweight   Monitor intake   Monitor weight   Dietitian assessment          Hypertension     Monitor BP   Monitor labs    BP currently at goal    Lasix   Potassium supplement          Depression     HX depression   Paroxetine   Wellbutrin   Monitor mood, appetite and sleep habits   Mental health to follow   Encourage her to participate in unit activities          GERD (gastroesophageal reflux disease)     Monitor gastric distress   Monitor appetite   Currently on probiotic          Lupus anticoagulant disorder (Multi) - Primary     HX hypercoagulation   Remains on eliquis    Monitor for any evidence bleeding          Hyperactivity of bladder     Remains on oxybutynin and Myrbetriq   monitor effectiveness               Medications, treatments, and labs reviewed  Continue medications and treatments as listed in EMR    ANISH Langley-LINDSAY      Electronically Signed By: Mariam ROUSSEAU  ANISH Moscoso-CNP   11/5/24  9:31 PM

## 2024-11-05 PROBLEM — F02.80 ALZHEIMER'S DISEASE: Status: ACTIVE | Noted: 2024-11-05

## 2024-11-05 PROBLEM — N31.8 HYPERACTIVITY OF BLADDER: Status: ACTIVE | Noted: 2024-11-05

## 2024-11-05 PROBLEM — F32.A DEPRESSION: Status: ACTIVE | Noted: 2024-11-05

## 2024-11-05 PROBLEM — G30.9 ALZHEIMER'S DISEASE: Status: ACTIVE | Noted: 2024-11-05

## 2024-11-05 PROBLEM — K21.9 GERD (GASTROESOPHAGEAL REFLUX DISEASE): Status: ACTIVE | Noted: 2024-11-05

## 2024-11-05 PROBLEM — E03.9 HYPOTHYROIDISM: Status: ACTIVE | Noted: 2024-11-05

## 2024-11-05 PROBLEM — I10 HYPERTENSION: Status: ACTIVE | Noted: 2024-11-05

## 2024-11-05 PROBLEM — E46 PROTEIN MALNUTRITION (MULTI): Status: ACTIVE | Noted: 2024-11-05

## 2024-11-05 PROBLEM — M19.90 OSTEOARTHRITIS: Status: ACTIVE | Noted: 2024-11-05

## 2024-11-05 PROBLEM — D68.62 LUPUS ANTICOAGULANT DISORDER (MULTI): Status: ACTIVE | Noted: 2024-11-05

## 2024-11-05 PROBLEM — R53.1 WEAKNESS: Status: ACTIVE | Noted: 2024-11-05

## 2024-11-06 NOTE — ASSESSMENT & PLAN NOTE
HX depression   Paroxetine   Wellbutrin   Monitor mood, appetite and sleep habits   Mental health to follow   Encourage her to participate in unit activities

## 2024-11-06 NOTE — ASSESSMENT & PLAN NOTE
HX adult failure to thrive   Frail underweight   Monitor intake   Monitor weight   Dietitian assessment

## 2024-11-06 NOTE — ASSESSMENT & PLAN NOTE
Reports chronic HX OA predominately in her knees   No inflammation   She reports that she has bone on bone.   Reports that tramadol is helpful  Therapy   Voltaren gel to her knees topically

## 2024-11-11 ENCOUNTER — NURSING HOME VISIT (OUTPATIENT)
Dept: POST ACUTE CARE | Facility: EXTERNAL LOCATION | Age: 84
End: 2024-11-11
Payer: MEDICARE

## 2024-11-11 DIAGNOSIS — K21.9 GASTROESOPHAGEAL REFLUX DISEASE WITHOUT ESOPHAGITIS: ICD-10-CM

## 2024-11-11 DIAGNOSIS — E46 PROTEIN MALNUTRITION (MULTI): ICD-10-CM

## 2024-11-11 DIAGNOSIS — F32.89 OTHER DEPRESSION: ICD-10-CM

## 2024-11-11 DIAGNOSIS — F02.80 ALZHEIMER'S DISEASE: ICD-10-CM

## 2024-11-11 DIAGNOSIS — R53.1 WEAKNESS: Primary | ICD-10-CM

## 2024-11-11 DIAGNOSIS — N31.8 HYPERACTIVITY OF BLADDER: ICD-10-CM

## 2024-11-11 DIAGNOSIS — D68.62 LUPUS ANTICOAGULANT DISORDER (MULTI): ICD-10-CM

## 2024-11-11 DIAGNOSIS — I15.8 OTHER SECONDARY HYPERTENSION: ICD-10-CM

## 2024-11-11 DIAGNOSIS — G30.9 ALZHEIMER'S DISEASE: ICD-10-CM

## 2024-11-11 DIAGNOSIS — E03.8 OTHER SPECIFIED HYPOTHYROIDISM: ICD-10-CM

## 2024-11-11 DIAGNOSIS — M15.8 OTHER OSTEOARTHRITIS INVOLVING MULTIPLE JOINTS: ICD-10-CM

## 2024-11-11 PROCEDURE — 99309 SBSQ NF CARE MODERATE MDM 30: CPT | Performed by: NURSE PRACTITIONER

## 2024-11-11 NOTE — LETTER
Patient: Josefa Richadrs  : 1940    Encounter Date: 2024    PROGRESS NOTE    Subjective  Chief complaint: Josefa Richards is a 84 y.o. female who is a long term care patient being seen and evaluated for mobility     HPI: Remains in therapy. The therapist reports that she is making progress and her endurance has improved. She is in bed. Social and talkative. Reports that she is feeling good. Denies any discomfort.   Reports that her pain meds are effective.   She reports that she has a good  and the food is good. Denies any gastric distress.   Reviewed labs and medications.      HPI      Objective  Vital signs: 127/56-90-18-98.1   Current weight -154     Physical Exam  Vitals and nursing note reviewed.   Constitutional:       General: She is not in acute distress.     Appearance: She is underweight.      Comments: slim   Eyes:      Extraocular Movements: Extraocular movements intact.   Cardiovascular:      Rate and Rhythm: Normal rate and regular rhythm.   Pulmonary:      Effort: Pulmonary effort is normal.      Breath sounds: Normal breath sounds.      Comments: Lungs clear   Abdominal:      General: Bowel sounds are normal.      Palpations: Abdomen is soft.   Musculoskeletal:      Cervical back: Neck supple.      Right lower leg: No edema.      Left lower leg: No edema.   Neurological:      Mental Status: She is alert.   Psychiatric:         Attention and Perception: Attention and perception normal.         Mood and Affect: Mood and affect normal.         Speech: Speech normal.         Behavior: Behavior is cooperative.         Assessment/Plan  Problem List Items Addressed This Visit       Hypothyroidism     Requires synthroid   Monitor TSH q 3 months   Monitor for symptoms of hypo/hyperthyroidism          Weakness - Primary     Remains in therapy   Monitor progress   Staff to assist with care only as required.   Therapy reports - making progress            Osteoarthritis     Reports chronic HX  OA predominately in her knees   No inflammation   She reports that she has bone on bone.   Reports that tramadol is helpful  Therapy   Voltaren gel to her knees topically            Alzheimer's disease     Has good social skills   Monitor judgement   BIMS score - 13 10/30/24   Is forgetful   Has been admitted into a secured unit for her safety.   Encourage all abilities   Monitor judgement and safety          Protein malnutrition (Multi)     HX adult failure to thrive   Frail underweight   Monitor intake   Monitor weight   Dietitian assessment          Hypertension     Monitor BP   Monitor labs    BP currently at goal    Lasix   Potassium supplement          Depression     HX depression   Paroxetine   Wellbutrin   Monitor mood, appetite and sleep habits   Mental health to follow   Encourage her to participate in unit activities          GERD (gastroesophageal reflux disease)     Monitor gastric distress   Monitor appetite   Currently on probiotic          Lupus anticoagulant disorder (Multi)     HX hypercoagulation   Remains on eliquis    Monitor for any evidence bleeding          Hyperactivity of bladder     Remains on oxybutynin and Myrbetriq   monitor effectiveness               Medications, treatments, and labs reviewed  Continue medications and treatments as listed in EMR    MARIBETH Langley      Electronically Signed By: MARIBETH Langley   11/14/24  7:53 PM

## 2024-11-13 NOTE — PROGRESS NOTES
PROGRESS NOTE    Subjective   Chief complaint: Josefa Richards is a 84 y.o. female who is a long term care patient being seen and evaluated for mobility     HPI: Remains in therapy. The therapist reports that she is making progress and her endurance has improved. She is in bed. Social and talkative. Reports that she is feeling good. Denies any discomfort.   Reports that her pain meds are effective.   She reports that she has a good  and the food is good. Denies any gastric distress.   Reviewed labs and medications.      HPI      Objective   Vital signs: 127/56-90-18-98.1   Current weight -154     Physical Exam  Vitals and nursing note reviewed.   Constitutional:       General: She is not in acute distress.     Appearance: She is underweight.      Comments: slim   Eyes:      Extraocular Movements: Extraocular movements intact.   Cardiovascular:      Rate and Rhythm: Normal rate and regular rhythm.   Pulmonary:      Effort: Pulmonary effort is normal.      Breath sounds: Normal breath sounds.      Comments: Lungs clear   Abdominal:      General: Bowel sounds are normal.      Palpations: Abdomen is soft.   Musculoskeletal:      Cervical back: Neck supple.      Right lower leg: No edema.      Left lower leg: No edema.   Neurological:      Mental Status: She is alert.   Psychiatric:         Attention and Perception: Attention and perception normal.         Mood and Affect: Mood and affect normal.         Speech: Speech normal.         Behavior: Behavior is cooperative.         Assessment/Plan   Problem List Items Addressed This Visit       Hypothyroidism     Requires synthroid   Monitor TSH q 3 months   Monitor for symptoms of hypo/hyperthyroidism          Weakness - Primary     Remains in therapy   Monitor progress   Staff to assist with care only as required.   Therapy reports - making progress            Osteoarthritis     Reports chronic HX OA predominately in her knees   No inflammation   She reports that she has  bone on bone.   Reports that tramadol is helpful  Therapy   Voltaren gel to her knees topically            Alzheimer's disease     Has good social skills   Monitor judgement   BIMS score - 13 10/30/24   Is forgetful   Has been admitted into a secured unit for her safety.   Encourage all abilities   Monitor judgement and safety          Protein malnutrition (Multi)     HX adult failure to thrive   Frail underweight   Monitor intake   Monitor weight   Dietitian assessment          Hypertension     Monitor BP   Monitor labs    BP currently at goal    Lasix   Potassium supplement          Depression     HX depression   Paroxetine   Wellbutrin   Monitor mood, appetite and sleep habits   Mental health to follow   Encourage her to participate in unit activities          GERD (gastroesophageal reflux disease)     Monitor gastric distress   Monitor appetite   Currently on probiotic          Lupus anticoagulant disorder (Multi)     HX hypercoagulation   Remains on eliquis    Monitor for any evidence bleeding          Hyperactivity of bladder     Remains on oxybutynin and Myrbetriq   monitor effectiveness               Medications, treatments, and labs reviewed  Continue medications and treatments as listed in EMR    Mariam Moscoso, APRN-CNP

## 2024-11-15 NOTE — ASSESSMENT & PLAN NOTE
Has good social skills   Monitor judgement   BIMS score - 13 10/30/24   Is forgetful   Has been admitted into a secured unit for her safety.   Encourage all abilities   Monitor judgement and safety

## 2024-11-15 NOTE — ASSESSMENT & PLAN NOTE
Remains in therapy   Monitor progress   Staff to assist with care only as required.   Therapy reports - making progress

## 2024-11-25 ENCOUNTER — NURSING HOME VISIT (OUTPATIENT)
Dept: POST ACUTE CARE | Facility: EXTERNAL LOCATION | Age: 84
End: 2024-11-25
Payer: MEDICARE

## 2024-11-25 DIAGNOSIS — G30.9 ALZHEIMER'S DISEASE: ICD-10-CM

## 2024-11-25 DIAGNOSIS — D68.62 LUPUS ANTICOAGULANT DISORDER (MULTI): ICD-10-CM

## 2024-11-25 DIAGNOSIS — K21.9 GASTROESOPHAGEAL REFLUX DISEASE, UNSPECIFIED WHETHER ESOPHAGITIS PRESENT: ICD-10-CM

## 2024-11-25 DIAGNOSIS — E46 PROTEIN MALNUTRITION (MULTI): ICD-10-CM

## 2024-11-25 DIAGNOSIS — F02.80 ALZHEIMER'S DISEASE: ICD-10-CM

## 2024-11-25 DIAGNOSIS — N31.8 HYPERACTIVITY OF BLADDER: ICD-10-CM

## 2024-11-25 DIAGNOSIS — M15.9 OSTEOARTHRITIS OF MULTIPLE JOINTS, UNSPECIFIED OSTEOARTHRITIS TYPE: ICD-10-CM

## 2024-11-25 DIAGNOSIS — E03.9 HYPOTHYROIDISM, UNSPECIFIED TYPE: ICD-10-CM

## 2024-11-25 DIAGNOSIS — I10 HYPERTENSION, UNSPECIFIED TYPE: ICD-10-CM

## 2024-11-25 DIAGNOSIS — R53.1 WEAKNESS: Primary | ICD-10-CM

## 2024-11-25 DIAGNOSIS — F32.A DEPRESSION, UNSPECIFIED DEPRESSION TYPE: ICD-10-CM

## 2024-11-25 DIAGNOSIS — N39.0 SYMPTOMATIC URINARY TRACT INFECTION: ICD-10-CM

## 2024-11-25 PROCEDURE — 99309 SBSQ NF CARE MODERATE MDM 30: CPT | Performed by: NURSE PRACTITIONER

## 2024-11-25 NOTE — LETTER
Patient: Josefa Richards  : 1940    Encounter Date: 2024    PROGRESS NOTE    Subjective  Chief complaint: Josefa Richards is a 84 y.o. female who is an acute skilled patient being seen and evaluated for weakness    HPI: Remains in therapy. She reports that she is doing well and feeling better. Denies any discomfort or pain. Reports that her appetite has improved. Nursing reported previous day- reported dysuria. Urine sample has been sent to the lab. Remains incontinent. Urine is odorous. Denies any dysuria at this visit. Bladder not distended. Remains afebrile. Encourage fluids   HPI      Objective  Vital signs: 128/66-88-18-98.1     Physical Exam  Vitals and nursing note reviewed.   Constitutional:       General: She is not in acute distress.     Appearance: She is underweight.   Eyes:      Extraocular Movements: Extraocular movements intact.   Cardiovascular:      Rate and Rhythm: Normal rate and regular rhythm.   Pulmonary:      Effort: Pulmonary effort is normal.      Breath sounds: Normal breath sounds.      Comments: Lungs clear   Abdominal:      General: Bowel sounds are normal.      Palpations: Abdomen is soft.   Genitourinary:     Comments: Bladder not distended   Urine odorous   Incontinent   Musculoskeletal:      Cervical back: Neck supple.      Right lower leg: No edema.      Left lower leg: No edema.   Neurological:      Mental Status: She is alert.   Psychiatric:         Attention and Perception: Attention and perception normal.         Mood and Affect: Mood and affect normal.         Speech: Speech normal.         Behavior: Behavior is cooperative.         Assessment/Plan  Problem List Items Addressed This Visit       Hypothyroidism     Requires synthroid   Monitor TSH q 3 months   Monitor for symptoms of hypo/hyperthyroidism          Weakness - Primary     Remains in therapy   Monitor progress   Staff to assist with care only as required.   Therapy reports - making progress             Osteoarthritis     Reports chronic HX OA predominately in her knees   No inflammation   She reports that she has bone on bone.   Reports that tramadol is helpful  Therapy   Voltaren gel to her knees topically            Alzheimer's disease     Has good social skills   Monitor judgement   BIMS score - 13 10/30/24   Is forgetful   Has been admitted into a secured unit for her safety.   Encourage all abilities   Monitor judgement and safety          Protein malnutrition (Multi)     HX adult failure to thrive   Frail underweight   Monitor intake   Monitor weight   Dietitian assessment          Hypertension     Monitor BP   Monitor labs    BP currently at goal    Lasix   Potassium supplement          Depression     HX depression   Paroxetine   Wellbutrin   Monitor mood, appetite and sleep habits   Mental health to follow   Encourage her to participate in unit activities          GERD (gastroesophageal reflux disease)     Monitor gastric distress   Monitor appetite   Currently on probiotic          Lupus anticoagulant disorder (Multi)     HX hypercoagulation   Remains on eliquis    Monitor for any evidence bleeding          Hyperactivity of bladder     Remains on oxybutynin and Myrbetriq   monitor effectiveness  Remains incontinent             Symptomatic urinary tract infection     Urine sample sent to lab   U/A pending   Bladder not distended   Reported dysuria previous day   Incontinent   Urine odorous   Encourage fluids   Stressed hygiene           Medications, treatments, and labs reviewed  Continue medications and treatments as listed in EMR    MARIBETH Langley      Electronically Signed By: MARIBETH Langley   11/28/24  6:15 PM

## 2024-11-26 NOTE — PROGRESS NOTES
PROGRESS NOTE    Subjective   Chief complaint: Josefa Richards is a 84 y.o. female who is an acute skilled patient being seen and evaluated for weakness    HPI: Remains in therapy. She reports that she is doing well and feeling better. Denies any discomfort or pain. Reports that her appetite has improved. Nursing reported previous day- reported dysuria. Urine sample has been sent to the lab. Remains incontinent. Urine is odorous. Denies any dysuria at this visit. Bladder not distended. Remains afebrile. Encourage fluids   HPI      Objective   Vital signs: 128/66-88-18-98.1     Physical Exam  Vitals and nursing note reviewed.   Constitutional:       General: She is not in acute distress.     Appearance: She is underweight.   Eyes:      Extraocular Movements: Extraocular movements intact.   Cardiovascular:      Rate and Rhythm: Normal rate and regular rhythm.   Pulmonary:      Effort: Pulmonary effort is normal.      Breath sounds: Normal breath sounds.      Comments: Lungs clear   Abdominal:      General: Bowel sounds are normal.      Palpations: Abdomen is soft.   Genitourinary:     Comments: Bladder not distended   Urine odorous   Incontinent   Musculoskeletal:      Cervical back: Neck supple.      Right lower leg: No edema.      Left lower leg: No edema.   Neurological:      Mental Status: She is alert.   Psychiatric:         Attention and Perception: Attention and perception normal.         Mood and Affect: Mood and affect normal.         Speech: Speech normal.         Behavior: Behavior is cooperative.         Assessment/Plan   Problem List Items Addressed This Visit       Hypothyroidism     Requires synthroid   Monitor TSH q 3 months   Monitor for symptoms of hypo/hyperthyroidism          Weakness - Primary     Remains in therapy   Monitor progress   Staff to assist with care only as required.   Therapy reports - making progress            Osteoarthritis     Reports chronic HX OA predominately in her knees    No inflammation   She reports that she has bone on bone.   Reports that tramadol is helpful  Therapy   Voltaren gel to her knees topically            Alzheimer's disease     Has good social skills   Monitor judgement   BIMS score - 13 10/30/24   Is forgetful   Has been admitted into a secured unit for her safety.   Encourage all abilities   Monitor judgement and safety          Protein malnutrition (Multi)     HX adult failure to thrive   Frail underweight   Monitor intake   Monitor weight   Dietitian assessment          Hypertension     Monitor BP   Monitor labs    BP currently at goal    Lasix   Potassium supplement          Depression     HX depression   Paroxetine   Wellbutrin   Monitor mood, appetite and sleep habits   Mental health to follow   Encourage her to participate in unit activities          GERD (gastroesophageal reflux disease)     Monitor gastric distress   Monitor appetite   Currently on probiotic          Lupus anticoagulant disorder (Multi)     HX hypercoagulation   Remains on eliquis    Monitor for any evidence bleeding          Hyperactivity of bladder     Remains on oxybutynin and Myrbetriq   monitor effectiveness  Remains incontinent             Symptomatic urinary tract infection     Urine sample sent to lab   U/A pending   Bladder not distended   Reported dysuria previous day   Incontinent   Urine odorous   Encourage fluids   Stressed hygiene           Medications, treatments, and labs reviewed  Continue medications and treatments as listed in EMR    Mariam Moscoso, APRN-CNP

## 2024-11-28 PROBLEM — N39.0 SYMPTOMATIC URINARY TRACT INFECTION: Status: ACTIVE | Noted: 2024-11-28

## 2024-11-28 NOTE — ASSESSMENT & PLAN NOTE
Urine sample sent to lab   U/A pending   Bladder not distended   Reported dysuria previous day   Incontinent   Urine odorous   Encourage fluids   Stressed hygiene

## 2024-12-02 ENCOUNTER — NURSING HOME VISIT (OUTPATIENT)
Dept: POST ACUTE CARE | Facility: EXTERNAL LOCATION | Age: 84
End: 2024-12-02
Payer: MEDICARE

## 2024-12-02 DIAGNOSIS — I10 HYPERTENSION, UNSPECIFIED TYPE: ICD-10-CM

## 2024-12-02 DIAGNOSIS — N31.8 HYPERACTIVITY OF BLADDER: ICD-10-CM

## 2024-12-02 DIAGNOSIS — R53.1 WEAKNESS: Primary | ICD-10-CM

## 2024-12-02 DIAGNOSIS — N30.00 ACUTE CYSTITIS WITHOUT HEMATURIA: ICD-10-CM

## 2024-12-02 DIAGNOSIS — K21.9 GASTROESOPHAGEAL REFLUX DISEASE, UNSPECIFIED WHETHER ESOPHAGITIS PRESENT: ICD-10-CM

## 2024-12-02 DIAGNOSIS — E46 PROTEIN MALNUTRITION (MULTI): ICD-10-CM

## 2024-12-02 DIAGNOSIS — F32.A DEPRESSION, UNSPECIFIED DEPRESSION TYPE: ICD-10-CM

## 2024-12-02 DIAGNOSIS — F02.80 ALZHEIMER'S DISEASE: ICD-10-CM

## 2024-12-02 DIAGNOSIS — D68.62 LUPUS ANTICOAGULANT DISORDER (MULTI): ICD-10-CM

## 2024-12-02 DIAGNOSIS — G30.9 ALZHEIMER'S DISEASE: ICD-10-CM

## 2024-12-02 DIAGNOSIS — E03.9 HYPOTHYROIDISM, UNSPECIFIED TYPE: ICD-10-CM

## 2024-12-02 DIAGNOSIS — M15.9 OSTEOARTHRITIS OF MULTIPLE JOINTS, UNSPECIFIED OSTEOARTHRITIS TYPE: ICD-10-CM

## 2024-12-02 PROCEDURE — 99309 SBSQ NF CARE MODERATE MDM 30: CPT | Performed by: NURSE PRACTITIONER

## 2024-12-02 NOTE — LETTER
Patient: Josefa Richards  : 1940    Encounter Date: 2024    PROGRESS NOTE    Subjective  Chief complaint: Josefa Richards is a 84 y.o. female who is a long term care patient being seen and evaluated for current UTI     HPI: She reported that she continues to work with therapy and is making progress. She denies any discomfort or pain. Reports that she likes to stay in her room with her things. Reports that she has a good appetite. Denies any gastric distress.   Remains on antibiotics for current UTI.    Denies any dysuria. Remains afebrile   Reports that she is sleeping well. Nursing denies any constitutional symptoms and reports that her intake is consistent.  Reviewed medications and labs   HPI      Objective  Vital signs: 121/67-79-18-97.8     Physical Exam  Vitals and nursing note reviewed.   Constitutional:       General: She is not in acute distress.     Appearance: She is underweight.   Eyes:      Extraocular Movements: Extraocular movements intact.   Cardiovascular:      Rate and Rhythm: Normal rate and regular rhythm.   Pulmonary:      Effort: Pulmonary effort is normal.      Breath sounds: Normal breath sounds.      Comments: Lungs clear   Abdominal:      General: Bowel sounds are normal.      Palpations: Abdomen is soft.   Genitourinary:     Comments: Bladder not distended   Incontinent   Musculoskeletal:      Cervical back: Neck supple.      Right lower leg: No edema.      Left lower leg: No edema.   Neurological:      Mental Status: She is alert.   Psychiatric:         Attention and Perception: Attention and perception normal.         Mood and Affect: Mood and affect normal.         Speech: Speech normal.         Behavior: Behavior is cooperative.         Assessment/Plan  Problem List Items Addressed This Visit       Hypothyroidism     Requires synthroid   Monitor TSH q 3 months   Monitor for symptoms of hypo/hyperthyroidism          Weakness - Primary     Remains in therapy   Monitor  progress   Staff to assist with care only as required.   Therapy reports - making progress            Osteoarthritis     Reports chronic HX OA predominately in her knees   No inflammation   She reports that she has bone on bone.   Reports that tramadol is helpful  Therapy   Voltaren gel to her knees topically            Alzheimer's disease     Has good social skills   Monitor judgement   BIMS score - 13 10/30/24   Is forgetful  Encourage all abilities   Monitor judgement and safety          Protein malnutrition (Multi)     HX adult failure to thrive   Frail underweight   Monitor intake   Monitor weight   Dietitian assessment          Hypertension     Monitor BP   Monitor labs    BP currently at goal    Lasix   Potassium supplement          Depression     HX depression   Paroxetine   Wellbutrin   Monitor mood, appetite and sleep habits   Mental health to follow   Encourage her to participate in unit activities          GERD (gastroesophageal reflux disease)     Monitor gastric distress   Monitor appetite   Currently on probiotic          Lupus anticoagulant disorder (Multi)     HX hypercoagulation   Remains on eliquis    Monitor for any evidence bleeding          Hyperactivity of bladder     Remains on oxybutynin and Myrbetriq   monitor effectiveness  Remains incontinent             UTI (urinary tract infection)      Started on antibiotics   Encourage fluids   Denies any dysuria   Stressed hygiene   Remains incontinent  Afebrile           Medications, treatments, and labs reviewed  Continue medications and treatments as listed in EMR    MARIBETH Langley      Electronically Signed By: MARIBETH Langley   12/5/24  7:54 PM

## 2024-12-02 NOTE — PROGRESS NOTES
PROGRESS NOTE    Subjective   Chief complaint: Josefa Richards is a 84 y.o. female who is a long term care patient being seen and evaluated for current UTI     HPI: She reported that she continues to work with therapy and is making progress. She denies any discomfort or pain. Reports that she likes to stay in her room with her things. Reports that she has a good appetite. Denies any gastric distress.   Remains on antibiotics for current UTI.    Denies any dysuria. Remains afebrile   Reports that she is sleeping well. Nursing denies any constitutional symptoms and reports that her intake is consistent.  Reviewed medications and labs   HPI      Objective   Vital signs: 121/67-79-18-97.8     Physical Exam  Vitals and nursing note reviewed.   Constitutional:       General: She is not in acute distress.     Appearance: She is underweight.   Eyes:      Extraocular Movements: Extraocular movements intact.   Cardiovascular:      Rate and Rhythm: Normal rate and regular rhythm.   Pulmonary:      Effort: Pulmonary effort is normal.      Breath sounds: Normal breath sounds.      Comments: Lungs clear   Abdominal:      General: Bowel sounds are normal.      Palpations: Abdomen is soft.   Genitourinary:     Comments: Bladder not distended   Incontinent   Musculoskeletal:      Cervical back: Neck supple.      Right lower leg: No edema.      Left lower leg: No edema.   Neurological:      Mental Status: She is alert.   Psychiatric:         Attention and Perception: Attention and perception normal.         Mood and Affect: Mood and affect normal.         Speech: Speech normal.         Behavior: Behavior is cooperative.         Assessment/Plan   Problem List Items Addressed This Visit       Hypothyroidism     Requires synthroid   Monitor TSH q 3 months   Monitor for symptoms of hypo/hyperthyroidism          Weakness - Primary     Remains in therapy   Monitor progress   Staff to assist with care only as required.   Therapy reports  - making progress            Osteoarthritis     Reports chronic HX OA predominately in her knees   No inflammation   She reports that she has bone on bone.   Reports that tramadol is helpful  Therapy   Voltaren gel to her knees topically            Alzheimer's disease     Has good social skills   Monitor judgement   BIMS score - 13 10/30/24   Is forgetful  Encourage all abilities   Monitor judgement and safety          Protein malnutrition (Multi)     HX adult failure to thrive   Frail underweight   Monitor intake   Monitor weight   Dietitian assessment          Hypertension     Monitor BP   Monitor labs    BP currently at goal    Lasix   Potassium supplement          Depression     HX depression   Paroxetine   Wellbutrin   Monitor mood, appetite and sleep habits   Mental health to follow   Encourage her to participate in unit activities          GERD (gastroesophageal reflux disease)     Monitor gastric distress   Monitor appetite   Currently on probiotic          Lupus anticoagulant disorder (Multi)     HX hypercoagulation   Remains on eliquis    Monitor for any evidence bleeding          Hyperactivity of bladder     Remains on oxybutynin and Myrbetriq   monitor effectiveness  Remains incontinent             UTI (urinary tract infection)      Started on antibiotics   Encourage fluids   Denies any dysuria   Stressed hygiene   Remains incontinent  Afebrile           Medications, treatments, and labs reviewed  Continue medications and treatments as listed in EMR    Mariam Moscoso, APRN-CNP

## 2024-12-06 NOTE — ASSESSMENT & PLAN NOTE
Has good social skills   Monitor judgement   BIMS score - 13 10/30/24   Is forgetful  Encourage all abilities   Monitor judgement and safety

## 2024-12-06 NOTE — ASSESSMENT & PLAN NOTE
Started on antibiotics   Encourage fluids   Denies any dysuria   Stressed hygiene   Remains incontinent  Afebrile

## 2024-12-09 ENCOUNTER — NURSING HOME VISIT (OUTPATIENT)
Dept: POST ACUTE CARE | Facility: EXTERNAL LOCATION | Age: 84
End: 2024-12-09
Payer: MEDICARE

## 2024-12-09 DIAGNOSIS — G30.9 ALZHEIMER'S DISEASE: ICD-10-CM

## 2024-12-09 DIAGNOSIS — N31.8 HYPERACTIVITY OF BLADDER: ICD-10-CM

## 2024-12-09 DIAGNOSIS — R53.1 WEAKNESS: Primary | ICD-10-CM

## 2024-12-09 DIAGNOSIS — E46 PROTEIN MALNUTRITION (MULTI): ICD-10-CM

## 2024-12-09 DIAGNOSIS — F32.A DEPRESSION, UNSPECIFIED DEPRESSION TYPE: ICD-10-CM

## 2024-12-09 DIAGNOSIS — F02.80 ALZHEIMER'S DISEASE: ICD-10-CM

## 2024-12-09 DIAGNOSIS — E03.9 HYPOTHYROIDISM, UNSPECIFIED TYPE: ICD-10-CM

## 2024-12-09 DIAGNOSIS — D68.62 LUPUS ANTICOAGULANT DISORDER (MULTI): ICD-10-CM

## 2024-12-09 DIAGNOSIS — M15.9 OSTEOARTHRITIS OF MULTIPLE JOINTS, UNSPECIFIED OSTEOARTHRITIS TYPE: ICD-10-CM

## 2024-12-09 DIAGNOSIS — K21.9 GASTROESOPHAGEAL REFLUX DISEASE, UNSPECIFIED WHETHER ESOPHAGITIS PRESENT: ICD-10-CM

## 2024-12-09 DIAGNOSIS — I10 HYPERTENSION, UNSPECIFIED TYPE: ICD-10-CM

## 2024-12-09 PROCEDURE — 99309 SBSQ NF CARE MODERATE MDM 30: CPT | Performed by: NURSE PRACTITIONER

## 2024-12-09 NOTE — PROGRESS NOTES
PROGRESS NOTE    Subjective   Chief complaint: Josefa Richards is a 84 y.o. female who is a long term care patient being seen and evaluated for medical care     HPI: Is sitting up in bed,  with her belongings surrounding here.  Immediately reports  that she is feeling good. Recognized my visit.   States that she is happy with the food and reports that the staff is helpful.   Smiling. Reports that she is stronger. Remains in therapy. Denies any pain or discomfort.   HPI    Objective   Vital signs: 121/67-79-16-97.8     Physical Exam  Vitals and nursing note reviewed.   Constitutional:       General: She is not in acute distress.     Appearance: She is underweight.   Eyes:      Extraocular Movements: Extraocular movements intact.   Cardiovascular:      Rate and Rhythm: Normal rate and regular rhythm.   Pulmonary:      Effort: Pulmonary effort is normal.      Breath sounds: Normal breath sounds.      Comments: Lungs clear   Abdominal:      General: Bowel sounds are normal.      Palpations: Abdomen is soft.   Genitourinary:     Comments: Bladder not distended   Incontinent   Musculoskeletal:      Cervical back: Neck supple.      Right lower leg: No edema.      Left lower leg: No edema.   Neurological:      Mental Status: She is alert.   Psychiatric:         Attention and Perception: Attention and perception normal.         Mood and Affect: Mood and affect normal.         Speech: Speech normal.         Behavior: Behavior is cooperative.         Assessment/Plan   Problem List Items Addressed This Visit       Hypothyroidism     Requires synthroid   Monitor TSH q 3 months   Monitor for symptoms of hypo/hyperthyroidism          Weakness - Primary     Remains in therapy   Monitor progress   Staff to assist with care only as required.   Therapy reports - making progress            Osteoarthritis     Reports chronic HX OA predominately in her knees   No inflammation   She reports that she has bone on bone.   Reports that  tramadol is helpful  Therapy   Voltaren gel to her knees topically            Alzheimer's disease     Has good social skills   Monitor judgement   BIMS score - 13 10/30/24   Is forgetful  Encourage all abilities   Monitor judgement and safety          Protein malnutrition (Multi)     HX adult failure to thrive   Frail underweight   Monitor intake - she is happy with the food    Monitor weight   Dietitian assessment          Hypertension     Monitor BP   Monitor labs    BP currently at goal    Lasix   Potassium supplement          Depression     HX depression   Paroxetine   Wellbutrin   Monitor mood, appetite and sleep habits   Mental health to follow   Encourage her to participate in unit activities          GERD (gastroesophageal reflux disease)     Monitor gastric distress   Monitor appetite   Currently on probiotic          Lupus anticoagulant disorder (Multi)     HX hypercoagulation   Remains on eliquis    Monitor for any evidence bleeding          Hyperactivity of bladder     Remains on oxybutynin and Myrbetriq   monitor effectiveness  Remains incontinent              Medications, treatments, and labs reviewed  Continue medications and treatments as listed in EMR    Mariam Moscoso, APRN-CNP

## 2024-12-09 NOTE — LETTER
Patient: Josefa Richards  : 1940    Encounter Date: 2024    PROGRESS NOTE    Subjective  Chief complaint: Josefa Richards is a 84 y.o. female who is a long term care patient being seen and evaluated for medical care     HPI: Is sitting up in bed,  with her belongings surrounding here.  Immediately reports  that she is feeling good. Recognized my visit.   States that she is happy with the food and reports that the staff is helpful.   Smiling. Reports that she is stronger. Remains in therapy. Denies any pain or discomfort.   HPI    Objective  Vital signs: 121/67-79-16-97.8     Physical Exam  Vitals and nursing note reviewed.   Constitutional:       General: She is not in acute distress.     Appearance: She is underweight.   Eyes:      Extraocular Movements: Extraocular movements intact.   Cardiovascular:      Rate and Rhythm: Normal rate and regular rhythm.   Pulmonary:      Effort: Pulmonary effort is normal.      Breath sounds: Normal breath sounds.      Comments: Lungs clear   Abdominal:      General: Bowel sounds are normal.      Palpations: Abdomen is soft.   Genitourinary:     Comments: Bladder not distended   Incontinent   Musculoskeletal:      Cervical back: Neck supple.      Right lower leg: No edema.      Left lower leg: No edema.   Neurological:      Mental Status: She is alert.   Psychiatric:         Attention and Perception: Attention and perception normal.         Mood and Affect: Mood and affect normal.         Speech: Speech normal.         Behavior: Behavior is cooperative.         Assessment/Plan  Problem List Items Addressed This Visit       Hypothyroidism     Requires synthroid   Monitor TSH q 3 months   Monitor for symptoms of hypo/hyperthyroidism          Weakness - Primary     Remains in therapy   Monitor progress   Staff to assist with care only as required.   Therapy reports - making progress            Osteoarthritis     Reports chronic HX OA predominately in her knees    No inflammation   She reports that she has bone on bone.   Reports that tramadol is helpful  Therapy   Voltaren gel to her knees topically            Alzheimer's disease     Has good social skills   Monitor judgement   BIMS score - 13 10/30/24   Is forgetful  Encourage all abilities   Monitor judgement and safety          Protein malnutrition (Multi)     HX adult failure to thrive   Frail underweight   Monitor intake - she is happy with the food    Monitor weight   Dietitian assessment          Hypertension     Monitor BP   Monitor labs    BP currently at goal    Lasix   Potassium supplement          Depression     HX depression   Paroxetine   Wellbutrin   Monitor mood, appetite and sleep habits   Mental health to follow   Encourage her to participate in unit activities          GERD (gastroesophageal reflux disease)     Monitor gastric distress   Monitor appetite   Currently on probiotic          Lupus anticoagulant disorder (Multi)     HX hypercoagulation   Remains on eliquis    Monitor for any evidence bleeding          Hyperactivity of bladder     Remains on oxybutynin and Myrbetriq   monitor effectiveness  Remains incontinent              Medications, treatments, and labs reviewed  Continue medications and treatments as listed in EMR    MARIBETH Langley      Electronically Signed By: MARIBETH Langley   12/12/24  8:26 PM

## 2024-12-13 NOTE — ASSESSMENT & PLAN NOTE
HX adult failure to thrive   Frail underweight   Monitor intake - she is happy with the food    Monitor weight   Dietitian assessment

## 2025-02-26 NOTE — ASSESSMENT & PLAN NOTE
Chronic condition.  Work-related.  Patient stated that suture was done but the patient did not require any surgery.  Presently no significant pain noted.   Has good social skills   Monitor judgement   BIMS score - 13 10/30/24   Is forgetful   Has been admitted into a secured unit for her safety.   Encourage all abilities   Monitor judgement and safety